# Patient Record
Sex: MALE | URBAN - METROPOLITAN AREA
[De-identification: names, ages, dates, MRNs, and addresses within clinical notes are randomized per-mention and may not be internally consistent; named-entity substitution may affect disease eponyms.]

---

## 2022-10-12 ENCOUNTER — OFFICE VISIT (OUTPATIENT)
Dept: URBAN - METROPOLITAN AREA CLINIC 63 | Facility: CLINIC | Age: 52
End: 2022-10-12
Payer: COMMERCIAL

## 2022-10-12 DIAGNOSIS — H43.811 VITREOUS DEGENERATION, RIGHT EYE: ICD-10-CM

## 2022-10-12 DIAGNOSIS — H04.123 DRY EYE SYNDROME OF BILATERAL LACRIMAL GLANDS: ICD-10-CM

## 2022-10-12 DIAGNOSIS — H25.813 COMBINED FORMS OF AGE-RELATED CATARACT, BILATERAL: ICD-10-CM

## 2022-10-12 DIAGNOSIS — E11.3313 TYPE 2 DIAB W MODERATE NONPRLF DIAB RTNOP W MACULAR EDEMA, BILATERAL: Primary | ICD-10-CM

## 2022-10-12 PROCEDURE — 92004 COMPRE OPH EXAM NEW PT 1/>: CPT | Performed by: OPTOMETRIST

## 2022-10-12 ASSESSMENT — INTRAOCULAR PRESSURE
OD: 14
OS: 14

## 2022-10-12 ASSESSMENT — KERATOMETRY
OS: 41.25
OD: 41.25

## 2022-10-12 NOTE — IMPRESSION/PLAN
Impression: Combined forms of age-related cataract, bilateral: H25.813.  Plan: wait on clearance from retina

## 2022-10-12 NOTE — IMPRESSION/PLAN
Impression: Type 2 diab w moderate nonprlf diab rtnop w macular edema, bilateral: C78.2832. Plan: Diabetes type II: moderate background diabetic retinopathy, no signs of neovascularization noted. Will refer patient for a retinal consult to determine if treatment is necessary. Discussed ocular and systemic benefits of maintaining blood sugar control.